# Patient Record
Sex: MALE | URBAN - METROPOLITAN AREA
[De-identification: names, ages, dates, MRNs, and addresses within clinical notes are randomized per-mention and may not be internally consistent; named-entity substitution may affect disease eponyms.]

---
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
## 2024-02-26 NOTE — HISTORY OF PRESENT ILLNESS
[de-identified] : 18 year old male with no past medical history referred to hem/onc by PMD for evaluation of elevated Hb. As per the patient and his mother he is other wise doing well, was incidentally found to have elevated Hb 18.1 and HCT 51.1 at a regular yearly visit with the PMD. As per the patient he had normal CBC in the past. He demies smoking, any kind of drug use or living at a high altitude. As per the mother he doesn't snore and no concern for obstructive sleep apnea. He has never been hospitalized or had a surgery with no significant family history.  H/o occasional nose bleed mostly in winter last bleeding in July, after nose picking [de-identified] : Currently doing well [FreeTextEntry1] : H/o occasional nose bleed mostly in winter last bleeding in July, after nose picking  H/o occasional nose bleed mostly in winter last bleeding in July, after nose picking

## 2024-02-26 NOTE — SOCIAL HISTORY
[FreeTextEntry1] : high school diploma, and looking for a job [Secondhand Smoke] : no exposure to  secondhand smoke [FreeTextEntry2] : works in [employer] [Sexually Active] : not sexually active

## 2024-02-26 NOTE — RESULTS/DATA
[FreeTextEntry1] : PS: RBC normocytic and normochromic, with normal appearing wbc and plenty of platelets